# Patient Record
Sex: FEMALE | Race: WHITE
[De-identification: names, ages, dates, MRNs, and addresses within clinical notes are randomized per-mention and may not be internally consistent; named-entity substitution may affect disease eponyms.]

---

## 2022-08-09 PROBLEM — Z00.00 ENCOUNTER FOR PREVENTIVE HEALTH EXAMINATION: Status: ACTIVE | Noted: 2022-08-09

## 2022-09-06 ENCOUNTER — APPOINTMENT (OUTPATIENT)
Dept: ULTRASOUND IMAGING | Facility: CLINIC | Age: 47
End: 2022-09-06

## 2022-09-06 ENCOUNTER — APPOINTMENT (OUTPATIENT)
Dept: MAMMOGRAPHY | Facility: CLINIC | Age: 47
End: 2022-09-06

## 2022-09-06 PROCEDURE — 77063 BREAST TOMOSYNTHESIS BI: CPT

## 2022-09-06 PROCEDURE — 77067 SCR MAMMO BI INCL CAD: CPT

## 2022-09-06 PROCEDURE — 76641 ULTRASOUND BREAST COMPLETE: CPT | Mod: LT

## 2022-09-06 PROCEDURE — 76830 TRANSVAGINAL US NON-OB: CPT

## 2022-09-06 PROCEDURE — 76856 US EXAM PELVIC COMPLETE: CPT

## 2023-08-08 ENCOUNTER — APPOINTMENT (OUTPATIENT)
Dept: OBGYN | Facility: CLINIC | Age: 48
End: 2023-08-08
Payer: COMMERCIAL

## 2023-08-08 VITALS
SYSTOLIC BLOOD PRESSURE: 124 MMHG | HEIGHT: 66 IN | OXYGEN SATURATION: 96 % | HEART RATE: 76 BPM | WEIGHT: 168 LBS | DIASTOLIC BLOOD PRESSURE: 66 MMHG | BODY MASS INDEX: 27 KG/M2

## 2023-08-08 DIAGNOSIS — Z82.49 FAMILY HISTORY OF ISCHEMIC HEART DISEASE AND OTHER DISEASES OF THE CIRCULATORY SYSTEM: ICD-10-CM

## 2023-08-08 DIAGNOSIS — F17.210 NICOTINE DEPENDENCE, CIGARETTES, UNCOMPLICATED: ICD-10-CM

## 2023-08-08 DIAGNOSIS — Z71.6 TOBACCO ABUSE COUNSELING: ICD-10-CM

## 2023-08-08 DIAGNOSIS — G40.909 EPILEPSY, UNSPECIFIED, NOT INTRACTABLE, W/OUT STATUS EPILEPTICUS: ICD-10-CM

## 2023-08-08 DIAGNOSIS — Z80.3 FAMILY HISTORY OF MALIGNANT NEOPLASM OF BREAST: ICD-10-CM

## 2023-08-08 PROCEDURE — 99203 OFFICE O/P NEW LOW 30 MIN: CPT

## 2023-08-08 RX ORDER — ESTRADIOL 0.05 MG/D
0.05 PATCH, EXTENDED RELEASE TRANSDERMAL
Qty: 8 | Refills: 3 | Status: ACTIVE | COMMUNITY
Start: 2023-08-08 | End: 1900-01-01

## 2023-08-08 RX ORDER — ESTRADIOL 0.05 MG/D
0.05 PATCH, EXTENDED RELEASE TRANSDERMAL
Qty: 8 | Refills: 2 | Status: ACTIVE | COMMUNITY
Start: 2023-08-08 | End: 1900-01-01

## 2023-08-08 RX ORDER — DIVALPROEX SODIUM 500 MG/1
500 TABLET, DELAYED RELEASE ORAL
Refills: 0 | Status: ACTIVE | COMMUNITY

## 2023-08-08 NOTE — PLAN
[FreeTextEntry1] : new pt consult for menopause hot flushes pt has hysterectomy around age 40 for fibriods over past few months has been very bothersome hot flushes, night sweats/ sleep disturbances Over 50% of 30 min new pt visit obtaining full history and then counseling and coordination of care. r/b/a discussed at length for above menopausal sx d/w pt HT (does NOT need progesterone due to hysterectomy) but main concern is smoking,  she is very motivated to stop smoking , risk including but not limited to stroke, MI discussed no personal hx of this d/w pt new alternative veozah, also lifestyle modifications. pt may try brief course and stop smoking, to see internist soon as well

## 2023-08-08 NOTE — HISTORY OF PRESENT ILLNESS
[Patient reported PAP Smear was normal] : Patient reported PAP Smear was normal [N] : Patient denies prior pregnancies [Currently Active] : currently active [Women] : women [Yes] : Yes [No] : No [Patient reported mammogram was normal] : Patient reported mammogram was normal [Patient reported breast sonogram was normal] : Patient reported breast sonogram was normal [FreeTextEntry1] : Antwon Molina presents for menopausal consultation. She states the hot flashes and sweats has been constant. [PapSmeardate] : 2023

## 2023-08-25 RX ORDER — ESTRADIOL 0.07 MG/D
0.07 PATCH, EXTENDED RELEASE TRANSDERMAL
Qty: 25 | Refills: 1 | Status: ACTIVE | COMMUNITY
Start: 2023-08-24 | End: 1900-01-01

## 2023-09-05 ENCOUNTER — APPOINTMENT (OUTPATIENT)
Dept: OBGYN | Facility: CLINIC | Age: 48
End: 2023-09-05
Payer: COMMERCIAL

## 2023-09-05 VITALS
OXYGEN SATURATION: 97 % | HEART RATE: 69 BPM | BODY MASS INDEX: 27.12 KG/M2 | WEIGHT: 168 LBS | SYSTOLIC BLOOD PRESSURE: 98 MMHG | DIASTOLIC BLOOD PRESSURE: 63 MMHG

## 2023-09-05 DIAGNOSIS — N95.1 MENOPAUSAL AND FEMALE CLIMACTERIC STATES: ICD-10-CM

## 2023-09-05 DIAGNOSIS — E34.9 ENDOCRINE DISORDER, UNSPECIFIED: ICD-10-CM

## 2023-09-05 PROCEDURE — 99213 OFFICE O/P EST LOW 20 MIN: CPT

## 2023-09-05 RX ORDER — ESTRADIOL 0.05 MG/D
0.05 PATCH, EXTENDED RELEASE TRANSDERMAL
Qty: 25 | Refills: 1 | Status: ACTIVE | COMMUNITY
Start: 2023-09-05 | End: 1900-01-01

## 2023-09-05 RX ORDER — ESTRADIOL 0.05 MG/D
0.05 PATCH, EXTENDED RELEASE TRANSDERMAL
Qty: 24 | Refills: 6 | Status: ACTIVE | COMMUNITY
Start: 2023-09-05 | End: 1900-01-01

## 2023-09-07 PROBLEM — E34.9 HORMONE IMBALANCE: Status: ACTIVE | Noted: 2023-09-07

## 2023-09-07 PROBLEM — N95.1 MENOPAUSAL HOT FLUSHES: Status: ACTIVE | Noted: 2023-08-08

## 2023-09-07 PROBLEM — N95.1 MENOPAUSAL STATE: Status: ACTIVE | Noted: 2023-08-08

## 2023-09-07 NOTE — HISTORY OF PRESENT ILLNESS
[Currently Active] : currently active [Women] : women [No] : No [FreeTextEntry1] : Antwon Molina presents for follow up regarding HT

## 2023-09-07 NOTE — PLAN
[FreeTextEntry1] : patient here to discuss and f/u HT patch (no progestin because does not have uterus) has helped , bp check fine today but issue with pharmacy rx .025 dose (?)  will want to try .05 dose, this rx r/b/a discussed smoking cessation already did "annual" previously this yr alternative like veozah discussed again Over 50% of 30 min visit counseling and coordination of care.

## 2023-09-11 DIAGNOSIS — R92.2 INCONCLUSIVE MAMMOGRAM: ICD-10-CM

## 2024-09-21 ENCOUNTER — NON-APPOINTMENT (OUTPATIENT)
Age: 49
End: 2024-09-21

## 2024-09-25 ENCOUNTER — RX RENEWAL (OUTPATIENT)
Age: 49
End: 2024-09-25

## 2024-09-26 ENCOUNTER — APPOINTMENT (OUTPATIENT)
Dept: OBGYN | Facility: CLINIC | Age: 49
End: 2024-09-26
Payer: COMMERCIAL

## 2024-09-26 VITALS
DIASTOLIC BLOOD PRESSURE: 84 MMHG | OXYGEN SATURATION: 98 % | BODY MASS INDEX: 27.28 KG/M2 | HEART RATE: 64 BPM | SYSTOLIC BLOOD PRESSURE: 123 MMHG | WEIGHT: 169 LBS

## 2024-09-26 DIAGNOSIS — Z01.419 ENCOUNTER FOR GYNECOLOGICAL EXAMINATION (GENERAL) (ROUTINE) W/OUT ABNORMAL FINDINGS: ICD-10-CM

## 2024-09-26 DIAGNOSIS — R87.810 CERVICAL HIGH RISK HUMAN PAPILLOMAVIRUS (HPV) DNA TEST POSITIVE: ICD-10-CM

## 2024-09-26 PROCEDURE — 99459 PELVIC EXAMINATION: CPT

## 2024-09-26 PROCEDURE — 99396 PREV VISIT EST AGE 40-64: CPT

## 2024-09-26 NOTE — PLAN
[FreeTextEntry1] : annual: pap and hpv (Note hx of hpv in past) mammogram orderedd s/p partial hyst doing well on estrogen quit smoking! had colonoscopy

## 2024-09-26 NOTE — PHYSICAL EXAM
[Chaperone Present] : A chaperone was present in the examining room during all aspects of the physical examination [[id number]] : A chaperone was present during the pelvic exam. [Appropriately responsive] : appropriately responsive [Alert] : alert [No Acute Distress] : no acute distress [No Lymphadenopathy] : no lymphadenopathy [Regular Rate Rhythm] : regular rate rhythm [No Murmurs] : no murmurs [Clear to Auscultation B/L] : clear to auscultation bilaterally [Soft] : soft [Non-tender] : non-tender [Non-distended] : non-distended [No Lesions] : no lesions [No Mass] : no mass [Oriented x3] : oriented x3 [FreeTextEntry2] : kai [Examination Of The Breasts] : a normal appearance [No Masses] : no breast masses were palpable [Labia Majora] : normal [Labia Minora] : normal [Normal] : normal [Absent] : absent [Uterine Adnexae] : normal

## 2024-09-26 NOTE — HISTORY OF PRESENT ILLNESS
[Patient reported mammogram was normal] : Patient reported mammogram was normal [Patient reported breast sonogram was normal] : Patient reported breast sonogram was normal [Patient reported PAP Smear was normal] : Patient reported PAP Smear was normal [Patient reported colonoscopy was normal] : Patient reported colonoscopy was normal [Y] : Patient is sexually active [N] : Patient denies prior pregnancies [LMP unknown] : LMP unknown [unknown] : Patient is unsure of the date of her LMP [Currently Active] : currently active [Women] : women [No] : No [Patient refuses STI testing] : Patient refuses STI testing [FreeTextEntry1] : 50 y/o patient present for a women's wellness exam. Patient states she wants to discuss menopause. Patient states she has no further complaints. [Mammogramdate] : 2023 [BreastSonogramDate] : 2023 [PapSmeardate] : 2022 [ColonoscopyDate] : 2022

## 2024-09-27 LAB — HPV HIGH+LOW RISK DNA PNL CVX: NOT DETECTED

## 2024-10-03 LAB — CYTOLOGY CVX/VAG DOC THIN PREP: NORMAL

## 2024-10-10 ENCOUNTER — TRANSCRIPTION ENCOUNTER (OUTPATIENT)
Age: 49
End: 2024-10-10

## 2024-10-14 ENCOUNTER — TRANSCRIPTION ENCOUNTER (OUTPATIENT)
Age: 49
End: 2024-10-14

## 2025-08-18 ENCOUNTER — RX RENEWAL (OUTPATIENT)
Age: 50
End: 2025-08-18

## 2025-09-04 ENCOUNTER — RX RENEWAL (OUTPATIENT)
Age: 50
End: 2025-09-04